# Patient Record
Sex: FEMALE | Race: BLACK OR AFRICAN AMERICAN | NOT HISPANIC OR LATINO | URBAN - METROPOLITAN AREA
[De-identification: names, ages, dates, MRNs, and addresses within clinical notes are randomized per-mention and may not be internally consistent; named-entity substitution may affect disease eponyms.]

---

## 2025-01-14 NOTE — PATIENT INSTRUCTIONS
Thank you for choosing [facility]  for your health care needs.  If you have any questions, concerns or medical needs, please feel free to contact our office at [phone].    Please ensure you complete your blood work one week before the next scheduled appointment.    To obtain blood tests today  No contraindication for the use of Ozempic  Counseled regarding the other treatment modalities for Graves' disease including radioactive iodine and total thyroidectomy  For thyroid ultrasound as scheduled at [facility]   Will follow up with results

## 2025-01-14 NOTE — PROGRESS NOTES
Subjective   Anne Guzman is a 44 y.o. female who I was asked to see in consultation for evaluation of hyperthyroidism. She is here with her sister.    She states that she was diagnosed with hyperthryodism in June 2024.  At that time, she was experiencing:  Leg swelling  Lost 75 lbs due to ozempic  Palpitations  She had an ER visit where Ozempic was stopped and she was started on Methimazole  She did see an endocrinologist in [place], [place]    She has had increased neck size     Current Regimen   Methimazle 30mg once daily started in July 2024  Stopped in December     She has been having fatigue and palpitations.  She has been having excessive menstrual bleeding, muscle spasm and memory loss    Symptoms related to thyroid disease are as listed below:  Energy levels -  fatigued   Sleep -  4-5 hours; has ADHD; mind cannot shut off   Temperature intolerances -  admits to being cold; last June  extremely hot   Bowel movements -  regular; flat and oily   Hair changes -  increased facial hair and underarm hair  Skin changes -  denies  Palpitations - admits   Dysphagia - admits; grunt noises; admits   Dyspnea upon lying supine -  denies  Dysphonia -  admits since October 2024  Weight changes -  gained 50 lbs in 1 month; max weight of 309 lbs     Last eye edam - 11/11/24  Using Rystasis eye drops   Denies sensitivity, lacrimation, eye pressure     She had COVID February 2024  She denies prednisone, denies CT     She has two children  She has one miscarriage at 24 weeks - hospitalized for one week; saw hematoogist for 6 months     She works in medical billing.      Review of Systems   Constitutional:  Positive for unexpected weight change.   HENT:  Positive for trouble swallowing.    Respiratory:  Positive for apnea. Chest tightness: Weight gain.        Snoring    Cardiovascular:  Positive for palpitations.   Gastrointestinal:  Positive for abdominal distention (bloated).   Endocrine: Positive for cold intolerance.  "  Musculoskeletal:  Positive for arthralgias, back pain, myalgias and neck pain.   Neurological:  Positive for weakness.        Tingling    Psychiatric/Behavioral:  The patient is nervous/anxious.    All other systems reviewed and are negative.      Objective   /80   Pulse 84   Ht 1.676 m (5' 6\")   Wt 135 kg (296 lb 9.6 oz)   BMI 47.87 kg/m²   Physical Exam  Vitals and nursing note reviewed.   Constitutional:       General: She is not in acute distress.     Appearance: Normal appearance. She is obese.   HENT:      Head: Normocephalic and atraumatic.      Nose: Nose normal.      Mouth/Throat:      Mouth: Mucous membranes are moist.   Eyes:      Extraocular Movements: Extraocular movements intact.   Neck:      Thyroid: Thyromegaly present. No thyroid tenderness.   Cardiovascular:      Rate and Rhythm: Normal rate and regular rhythm.   Pulmonary:      Effort: Pulmonary effort is normal.      Breath sounds: Normal breath sounds.   Musculoskeletal:         General: Normal range of motion.   Skin:     General: Skin is warm.   Neurological:      Mental Status: She is alert and oriented to person, place, and time.      Comments: No tremors to outstretched hands     Psychiatric:         Mood and Affect: Mood normal.        Labs  Component  Ref Range & Units 7 mo ago   TSH  0.27 - 4.20 uIU/mL <0.01 Low      Component  Ref Range & Units 7 mo ago   T4 Free  0.9 - 1.7 ng/dL 5.4 High      December 14, 2024  TSH 45.18  FT4 0.2  T3 < 25    Imaging   FINDINGS:   Right thyroid lobe: 7.3 x 3.2 x 3.4cm     Left thyroid lobe: 6.1 x 2.9 x 3.1cm     Isthmus: 2.8cm     Echotexture: Lobular, heterogeneous, and enlarged thyroid gland.     Vascularity: Diffusely increased vascularity.     Thyroid Nodules: No discrete thyroid nodules.     Soft tissues: No visualized lymphadenopathy     Assessment/Plan   44 year old female presents for the evaluation and further management of Grave's disease. Methimazole was stopped in December due to " hypothyroidism.    Graves disease  To obtain blood tests today  No contraindication for the use of Ozempic  Counseled regarding the other treatment modalities for Graves' disease including radioactive iodine and total thyroidectomy  For thyroid ultrasound as scheduled at [facility]   Will follow up with results

## 2025-01-15 ENCOUNTER — LAB (OUTPATIENT)
Dept: LAB | Facility: LAB | Age: 45
End: 2025-01-15
Payer: COMMERCIAL

## 2025-01-15 ENCOUNTER — APPOINTMENT (OUTPATIENT)
Dept: ENDOCRINOLOGY | Facility: CLINIC | Age: 45
End: 2025-01-15
Payer: COMMERCIAL

## 2025-01-15 VITALS
HEIGHT: 66 IN | WEIGHT: 293 LBS | HEART RATE: 84 BPM | BODY MASS INDEX: 47.09 KG/M2 | SYSTOLIC BLOOD PRESSURE: 124 MMHG | DIASTOLIC BLOOD PRESSURE: 80 MMHG

## 2025-01-15 DIAGNOSIS — E05.00 THYROTOXICOSIS WITH DIFFUSE GOITER AND WITHOUT THYROID STORM: ICD-10-CM

## 2025-01-15 DIAGNOSIS — E05.00 GRAVES DISEASE: ICD-10-CM

## 2025-01-15 DIAGNOSIS — E05.00 THYROTOXICOSIS WITH DIFFUSE GOITER AND WITHOUT THYROID STORM: Primary | ICD-10-CM

## 2025-01-15 LAB
T3 SERPL-MCNC: 139 NG/DL (ref 60–200)
T4 FREE SERPL-MCNC: 0.32 NG/DL (ref 0.61–1.12)
THYROPEROXIDASE AB SERPL-ACNC: 106 IU/ML
TSH SERPL-ACNC: 4.53 MIU/L (ref 0.44–3.98)

## 2025-01-15 PROCEDURE — 99204 OFFICE O/P NEW MOD 45 MIN: CPT | Performed by: INTERNAL MEDICINE

## 2025-01-15 PROCEDURE — 3008F BODY MASS INDEX DOCD: CPT | Performed by: INTERNAL MEDICINE

## 2025-01-15 PROCEDURE — 83519 RIA NONANTIBODY: CPT

## 2025-01-15 RX ORDER — LOSARTAN POTASSIUM 50 MG/1
1 TABLET ORAL
COMMUNITY
Start: 2024-05-01

## 2025-01-15 RX ORDER — POLYETHYLENE GLYCOL 400 AND PROPYLENE GLYCOL 4; 3 MG/ML; MG/ML
SOLUTION/ DROPS OPHTHALMIC
COMMUNITY
Start: 2024-12-02

## 2025-01-15 RX ORDER — DEXMETHYLPHENIDATE HYDROCHLORIDE 2.5 MG/1
TABLET ORAL
COMMUNITY
Start: 2024-12-11

## 2025-01-15 RX ORDER — FUROSEMIDE 20 MG/1
40 TABLET ORAL
COMMUNITY

## 2025-01-15 RX ORDER — CLONIDINE HYDROCHLORIDE 0.1 MG/1
0.1 TABLET ORAL
COMMUNITY
Start: 2024-12-11

## 2025-01-15 RX ORDER — METOPROLOL SUCCINATE 50 MG/1
1.5 TABLET, EXTENDED RELEASE ORAL 2 TIMES DAILY
COMMUNITY
Start: 2025-01-09

## 2025-01-15 RX ORDER — CYCLOSPORINE 0.5 MG/ML
EMULSION OPHTHALMIC
COMMUNITY
Start: 2024-09-09

## 2025-01-15 RX ORDER — APIXABAN 5 MG/1
5 TABLET, FILM COATED ORAL EVERY 12 HOURS
COMMUNITY

## 2025-01-15 RX ORDER — GLIMEPIRIDE 2 MG/1
5 TABLET ORAL
COMMUNITY
Start: 2024-12-02

## 2025-01-15 ASSESSMENT — ENCOUNTER SYMPTOMS
UNEXPECTED WEIGHT CHANGE: 1
NERVOUS/ANXIOUS: 1
ABDOMINAL DISTENTION: 1

## 2025-01-16 DIAGNOSIS — E05.00 GRAVES DISEASE: ICD-10-CM

## 2025-01-16 DIAGNOSIS — E05.00 THYROTOXICOSIS WITH DIFFUSE GOITER AND WITHOUT THYROID STORM: Primary | ICD-10-CM

## 2025-01-16 NOTE — RESULT ENCOUNTER NOTE
Labs have been reviewed.  The thyroid levels have improved but still remain low since stopping Methimazole in December.  Please repeat levels again in 3-4 weeks as the expectation is the thyroid level may increase again, necessitating the use of Methimazole at a lower dose

## 2025-01-17 LAB — TSH RECEP AB SER-ACNC: 24.7 IU/L

## 2025-01-19 PROBLEM — E05.00 GRAVES DISEASE: Status: ACTIVE | Noted: 2025-01-19

## 2025-01-19 ASSESSMENT — ENCOUNTER SYMPTOMS
BACK PAIN: 1
TROUBLE SWALLOWING: 1
NECK PAIN: 1
MYALGIAS: 1
PALPITATIONS: 1
WEAKNESS: 1
APNEA: 1
ARTHRALGIAS: 1

## 2025-01-20 LAB — THYROID STIMULATING IMMUNOGLOBULIN: 256 % BASELINE

## 2025-01-20 NOTE — ASSESSMENT & PLAN NOTE
To obtain blood tests today  No contraindication for the use of Ozempic  Counseled regarding the other treatment modalities for Graves' disease including radioactive iodine and total thyroidectomy  For thyroid ultrasound as scheduled at [facility]   Will follow up with results